# Patient Record
Sex: FEMALE | Race: WHITE | Employment: FULL TIME | URBAN - METROPOLITAN AREA
[De-identification: names, ages, dates, MRNs, and addresses within clinical notes are randomized per-mention and may not be internally consistent; named-entity substitution may affect disease eponyms.]

---

## 2024-04-27 ENCOUNTER — HOSPITAL ENCOUNTER (OUTPATIENT)
Age: 39
Discharge: HOME | End: 2024-04-27
Payer: COMMERCIAL

## 2024-04-27 VITALS
BODY MASS INDEX: 45 KG/M2 | RESPIRATION RATE: 18 BRPM | SYSTOLIC BLOOD PRESSURE: 150 MMHG | TEMPERATURE: 97 F | OXYGEN SATURATION: 98 % | HEART RATE: 102 BPM | DIASTOLIC BLOOD PRESSURE: 89 MMHG | WEIGHT: 290 LBS

## 2024-04-27 DIAGNOSIS — H10.31 ACUTE CONJUNCTIVITIS OF RIGHT EYE, UNSPECIFIED ACUTE CONJUNCTIVITIS TYPE: Primary | ICD-10-CM

## 2024-04-27 DIAGNOSIS — H01.139 ATOPIC DERMATITIS OF EYELID: ICD-10-CM

## 2024-04-27 PROCEDURE — 99213 OFFICE O/P EST LOW 20 MIN: CPT | Performed by: NURSE PRACTITIONER

## 2024-04-27 RX ORDER — TOBRAMYCIN 3 MG/ML
2 SOLUTION/ DROPS OPHTHALMIC
Qty: 1 EACH | Refills: 0 | Status: SHIPPED | OUTPATIENT
Start: 2024-04-27 | End: 2024-05-02

## 2024-04-27 RX ORDER — TRIAMCINOLONE ACETONIDE 1 MG/G
CREAM TOPICAL 2 TIMES DAILY
Qty: 45 G | Refills: 0 | Status: SHIPPED | OUTPATIENT
Start: 2024-04-27 | End: 2024-05-04

## 2024-04-27 NOTE — DISCHARGE INSTRUCTIONS
Rest and drink plenty of fluids.   This is highly contagious.  Use good hand washing and do not share towels or washcloths.   Start the antibiotic drops and make sure to finish the prescription.   Wash eyelids with baby shampoo as needed in the morning.   Make sure to wash pillowcases and towel after being on the antibiotics for 48 hours.   Avoid touching your eye.  Apply Kenalog cream to the eyelids twice a day as prescribed.  Use this only for 1 week.  Try using Aquaphor ointment at night to help hydrate the skin.  Follow up with your PCP or eye doctor in 3-5 days as needed.

## 2024-04-27 NOTE — ED INITIAL ASSESSMENT (HPI)
Wednesday, pt developed right eye redness, pressure and irritation. Denies injury or eye discharge.

## 2024-04-27 NOTE — ED PROVIDER NOTES
Patient Seen in: [facility]      History     Chief Complaint   Patient presents with    Eye Visual Problem     Stated Complaint: Eye Issue    Subjective:   40 yo female presents to the immediate care with c/o right eye redness.  She states she started noticing some redness to her right eye on Wednesday.  Since then the redness has worsened.  She has had some crusting and drainage as well.  She denies any fever, chills, visual changes, photophobia, eye pain, nasal drainage, or sore throat.     The history is provided by the patient.         Objective:   History reviewed. No pertinent past medical history.           History reviewed. No pertinent surgical history.             Social History     Socioeconomic History    Marital status:    Tobacco Use    Smoking status: Never     Passive exposure: Never    Smokeless tobacco: Never   Vaping Use    Vaping status: Never Used              Review of Systems   Constitutional: Negative.  Negative for chills and fever.   HENT: Negative.  Negative for congestion, ear pain and sore throat.    Eyes:  Positive for discharge, redness and itching. Negative for photophobia, pain and visual disturbance.   Respiratory: Negative.     All other systems reviewed and are negative.      Positive for stated complaint: Eye Issue  Other systems are as noted in HPI.  Constitutional and vital signs reviewed.      All other systems reviewed and negative except as noted above.    Physical Exam     ED Triage Vitals [04/27/24 0827]   /89   Pulse 102   Resp 18   Temp 97.3 °F (36.3 °C)   Temp src Temporal   SpO2 98 %   O2 Device None (Room air)       Current:/89   Pulse 102   Temp 97.3 °F (36.3 °C) (Temporal)   Resp 18   Wt 131.5 kg   LMP 04/03/2024 (Approximate)   SpO2 98%   BMI 45.42 kg/m²         Physical Exam  Vitals and nursing note reviewed.   Constitutional:       General: She is not in acute distress.     Appearance: Normal appearance. She is obese. She is  not ill-appearing.   HENT:      Head: Normocephalic and atraumatic.      Right Ear: Tympanic membrane, ear canal and external ear normal.      Left Ear: Tympanic membrane, ear canal and external ear normal.      Nose: Nose normal.      Mouth/Throat:      Mouth: Mucous membranes are moist.      Pharynx: Oropharynx is clear.   Eyes:      General: Vision grossly intact. Gaze aligned appropriately.      Extraocular Movements: Extraocular movements intact.      Conjunctiva/sclera:      Right eye: Right conjunctiva is injected.      Pupils: Pupils are equal, round, and reactive to light.      Comments: Scaled erythematous patches to bilateral eyelids.     Cardiovascular:      Rate and Rhythm: Normal rate and regular rhythm.      Pulses: Normal pulses.      Heart sounds: Normal heart sounds. No murmur heard.  Pulmonary:      Effort: Pulmonary effort is normal. No respiratory distress.      Breath sounds: Normal breath sounds.   Musculoskeletal:         General: Normal range of motion.   Skin:     General: Skin is warm and dry.   Neurological:      General: No focal deficit present.      Mental Status: She is alert and oriented to person, place, and time.   Psychiatric:         Mood and Affect: Mood normal.         Behavior: Behavior normal.           ED Course   Labs Reviewed - No data to display                 MDM                             Medical Decision Making  39-year-old female with right eye conjunctivitis and atopic Dermatitis of bilateral eyelids.  Will plan to treat patient with topical antibiotics as well as a mid potency steroid cream.  Recommended patient use Aquaphor ointment to her eyelids at night.  Supportive management at home also encouraged with regards to the infection.  No evidence of keratitis, iritis, uveitis, or corneal abrasion.  Patient to follow-up with eye doctor or return as needed.    Risk  OTC drugs.  Prescription drug management.        Disposition and Plan     Clinical Impression:  1.  Acute conjunctivitis of right eye, unspecified acute conjunctivitis type    2. Atopic dermatitis of eyelid         Disposition:  Discharge  4/27/2024  8:47 am    Follow-up:  No follow-up provider specified.        Medications Prescribed:  Discharge Medication List as of 4/27/2024  8:49 AM        START taking these medications    Details   tobramycin 0.3 % Ophthalmic Solution Apply 2 drops to eye every 2 (two) hours while awake for 5 days., Normal, Disp-1 each, R-0      triamcinolone 0.1 % External Cream Apply topically 2 (two) times daily for 7 days., Normal, Disp-45 g, R-0